# Patient Record
Sex: MALE | NOT HISPANIC OR LATINO | Employment: UNEMPLOYED | URBAN - METROPOLITAN AREA
[De-identification: names, ages, dates, MRNs, and addresses within clinical notes are randomized per-mention and may not be internally consistent; named-entity substitution may affect disease eponyms.]

---

## 2022-03-01 ENCOUNTER — MEDICAL CORRESPONDENCE (OUTPATIENT)
Dept: HEALTH INFORMATION MANAGEMENT | Facility: CLINIC | Age: 22
End: 2022-03-01

## 2022-03-01 ENCOUNTER — TRANSFERRED RECORDS (OUTPATIENT)
Dept: HEALTH INFORMATION MANAGEMENT | Facility: CLINIC | Age: 22
End: 2022-03-01

## 2022-03-02 ENCOUNTER — TRANSCRIBE ORDERS (OUTPATIENT)
Dept: OTHER | Age: 22
End: 2022-03-02

## 2022-03-02 DIAGNOSIS — S62.639A: Primary | ICD-10-CM

## 2022-03-04 ENCOUNTER — OFFICE VISIT (OUTPATIENT)
Dept: ORTHOPEDICS | Facility: CLINIC | Age: 22
End: 2022-03-04
Payer: COMMERCIAL

## 2022-03-04 ENCOUNTER — ANCILLARY PROCEDURE (OUTPATIENT)
Dept: GENERAL RADIOLOGY | Facility: CLINIC | Age: 22
End: 2022-03-04
Attending: STUDENT IN AN ORGANIZED HEALTH CARE EDUCATION/TRAINING PROGRAM
Payer: COMMERCIAL

## 2022-03-04 VITALS — BODY MASS INDEX: 26.23 KG/M2 | HEIGHT: 71 IN | WEIGHT: 187.39 LBS

## 2022-03-04 DIAGNOSIS — S62.639A: ICD-10-CM

## 2022-03-04 DIAGNOSIS — S62.637D CLOSED DISPLACED FRACTURE OF DISTAL PHALANX OF LEFT LITTLE FINGER WITH ROUTINE HEALING, SUBSEQUENT ENCOUNTER: Primary | ICD-10-CM

## 2022-03-04 LAB — RADIOLOGIST FLAGS: NORMAL

## 2022-03-04 PROCEDURE — 73140 X-RAY EXAM OF FINGER(S): CPT | Mod: LT | Performed by: RADIOLOGY

## 2022-03-04 PROCEDURE — 99204 OFFICE O/P NEW MOD 45 MIN: CPT | Performed by: STUDENT IN AN ORGANIZED HEALTH CARE EDUCATION/TRAINING PROGRAM

## 2022-03-04 NOTE — LETTER
"  3/4/2022      RE: Tino Anderson Clercq  [address]  [facility] [address]       [facility]  Sports Medicine Clinic  Clinics and Surgery Center           SUBJECTIVE       Tino Griffin is a 21 year old male presenting to clinic today with left finger injury. He was practicing a tackle and the other person fell on his hand and had immediate pain without deformity. He was seen at [facility] on 3/1/22, where they obtained XR which revealed a DIP fx of the little finger. He was placed in a removable finger splint and referred her for further evaluation. Reports swelling and slight ecchymosis. He is right hand dominant. He does enjoy playing [employer] in his spare time.     Background:   Date of injury: 2/27/22  Duration of symptoms: 4 days   Mechanism of Injury: Fall while doing [employer]   Intensity: 0/10   Aggravating factors: Movement   Relieving Factors: splint   Prior Evaluation: Yes [facility], Medications and Allergies were reviewed and updated as needed.    ROS:  As noted above otherwise negative.    There is no problem list on file for this patient.      No current outpatient medications on file.            OBJECTIVE:       Vitals:   Vitals:    03/04/22 1502   Weight: 85 kg (187 lb 6.3 oz)   Height: 1.8 m (5' 10.87\")     BMI: Body mass index is 26.23 kg/m .    Gen:  Well nourished and in no acute distress  HEENT: Extraocular movement intact  Neck: Supple  Pulm:  Breathing Comfortably. No increased respiratory effort.  Psych: Euthymic. Appropriately answers questions    MSK: Left little finger with ecchymosis and edema along the DIP region.  Inability for full passive extension of the DIP, active extension of the DIP for the last 5 degrees.  Mild tenderness to palpation along the DIP region without evidence of rotational deformity.   strength mildly reduced 4/5.    XRAY : New x-ray obtained today with the DIP mildly displaced fracture of the base of the distal " phalanx involving roughly 50 to 70% of the articular surface.          ASSESSMENT and PLAN:     Tino was seen today for consult for.    Diagnoses and all orders for this visit:    Fracture, finger, distal phalanx  -     Orthopedic  Referral  -     X-ray lt finger 2-3 view; Future    21-year-old male presenting to clinic today with concerns for a mildly displaced fracture of the base of the distal phalanx.  This is complicated due to the fact the patient does have about greater than 50% of the articular surface involved.  This is shown again on x-ray today.  I have discussed this with the patient in detail.    Plan: Given this fracture pattern, we have referred the patient to orthopedic hand surgery for further evaluation given the involvement of the articular surface.  Schedule an appointment has been made.  Please follow-up at that point.  We have also continue to place the patient in a mallet finger splint.  He is leaving for a trip this weekend and will be gone through next Wednesday.  Have advised on protection precautions that he should ensue while traveling as well as wearing of the splint throughout the time.    Options for treatment and/or follow-up care were reviewed with the patient was actively involved in the decision making process. Patient verbalized understanding and was in agreement with the plan.    Marty Tejada DO  , Sports Medicine  Department of Family Medicine and [facility]  [facility]        Marty Tejada DO

## 2022-03-04 NOTE — LETTER
Date:March 7, 2022      Patient was self referred, no letter generated. Do not send.        [facility] Information

## 2022-03-04 NOTE — PROGRESS NOTES
"[facility]  Sports Medicine Clinic  Clinics and Surgery Center           SUBJECTIVE       Tino Griffin is a 21 year old male presenting to clinic today with left finger injury. He was practicing a tackle and the other person fell on his hand and had immediate pain without deformity. He was seen at [facility] on 3/1/22, where they obtained XR which revealed a DIP fx of the little finger. He was placed in a removable finger splint and referred her for further evaluation. Reports swelling and slight ecchymosis. He is right hand dominant. He does enjoy playing [employer] in his spare time.     Background:   Date of injury: 2/27/22  Duration of symptoms: 4 days   Mechanism of Injury: Fall while doing [employer]   Intensity: 0/10   Aggravating factors: Movement   Relieving Factors: splint   Prior Evaluation: Yes [facility], Medications and Allergies were reviewed and updated as needed.    ROS:  As noted above otherwise negative.    There is no problem list on file for this patient.      No current outpatient medications on file.            OBJECTIVE:       Vitals:   Vitals:    03/04/22 1502   Weight: 85 kg (187 lb 6.3 oz)   Height: 1.8 m (5' 10.87\")     BMI: Body mass index is 26.23 kg/m .    Gen:  Well nourished and in no acute distress  HEENT: Extraocular movement intact  Neck: Supple  Pulm:  Breathing Comfortably. No increased respiratory effort.  Psych: Euthymic. Appropriately answers questions    MSK: Left little finger with ecchymosis and edema along the DIP region.  Inability for full passive extension of the DIP, active extension of the DIP for the last 5 degrees.  Mild tenderness to palpation along the DIP region without evidence of rotational deformity.   strength mildly reduced 4/5.    XRAY : New x-ray obtained today with the DIP mildly displaced fracture of the base of the distal phalanx involving roughly 50 to 70% of the articular surface.          ASSESSMENT and PLAN: "     Tino was seen today for consult for.    Diagnoses and all orders for this visit:    Fracture, finger, distal phalanx  -     Orthopedic  Referral  -     X-ray lt finger 2-3 view; Future    21-year-old male presenting to clinic today with concerns for a mildly displaced fracture of the base of the distal phalanx.  This is complicated due to the fact the patient does have about greater than 50% of the articular surface involved.  This is shown again on x-ray today.  I have discussed this with the patient in detail.    Plan: Given this fracture pattern, we have referred the patient to orthopedic hand surgery for further evaluation given the involvement of the articular surface.  Schedule an appointment has been made.  Please follow-up at that point.  We have also continue to place the patient in a mallet finger splint.  He is leaving for a trip this weekend and will be gone through next Wednesday.  Have advised on protection precautions that he should ensue while traveling as well as wearing of the splint throughout the time.    Options for treatment and/or follow-up care were reviewed with the patient was actively involved in the decision making process. Patient verbalized understanding and was in agreement with the plan.    Marty Tejada DO  , Sports Medicine  Department of Family Medicine and [facility]  [facility]

## 2022-03-04 NOTE — PROGRESS NOTES
He was practicing a tackle and the other person fell on his hand and had immediate pain without deformity. He was seen at [facility] on 3/1/22, where they obtained XR which revealed a DIP fx of the little finger. He was placed in a removable finger splint and referred her for further evaluation. Reports swelling and slight ecchymosis. He is right hand dominant. He does enjoy playing [employer] in his spare time.

## 2022-03-07 NOTE — TELEPHONE ENCOUNTER
RECORDS RECEIVED FROM: Left finger avulsion fx little finger OK per elizabeth   DATE RECEIVED: Mar 8, 2022     NOTES STATUS DETAILS   OFFICE NOTE from referring provider Internal  Marty Tejada DO          OFFICE NOTE from other specialist INTERNAL MIGUEL   MEDICATION LIST Internal    XRAYS (IMAGES & REPORTS) Internal 3/4/22

## 2022-03-08 ENCOUNTER — LAB (OUTPATIENT)
Dept: LAB | Facility: CLINIC | Age: 22
End: 2022-03-08
Payer: COMMERCIAL

## 2022-03-08 ENCOUNTER — TELEPHONE (OUTPATIENT)
Dept: ORTHOPEDICS | Facility: CLINIC | Age: 22
End: 2022-03-08

## 2022-03-08 ENCOUNTER — HOSPITAL ENCOUNTER (OUTPATIENT)
Facility: AMBULATORY SURGERY CENTER | Age: 22
End: 2022-03-08
Attending: STUDENT IN AN ORGANIZED HEALTH CARE EDUCATION/TRAINING PROGRAM
Payer: COMMERCIAL

## 2022-03-08 ENCOUNTER — TRANSFERRED RECORDS (OUTPATIENT)
Dept: HEALTH INFORMATION MANAGEMENT | Facility: CLINIC | Age: 22
End: 2022-03-08

## 2022-03-08 ENCOUNTER — PRE VISIT (OUTPATIENT)
Dept: ORTHOPEDICS | Facility: CLINIC | Age: 22
End: 2022-03-08
Payer: COMMERCIAL

## 2022-03-08 ENCOUNTER — OFFICE VISIT (OUTPATIENT)
Dept: ORTHOPEDICS | Facility: CLINIC | Age: 22
End: 2022-03-08
Payer: COMMERCIAL

## 2022-03-08 ENCOUNTER — ANCILLARY PROCEDURE (OUTPATIENT)
Dept: GENERAL RADIOLOGY | Facility: CLINIC | Age: 22
End: 2022-03-08
Attending: STUDENT IN AN ORGANIZED HEALTH CARE EDUCATION/TRAINING PROGRAM
Payer: COMMERCIAL

## 2022-03-08 DIAGNOSIS — S62.637D CLOSED DISPLACED FRACTURE OF DISTAL PHALANX OF LEFT LITTLE FINGER WITH ROUTINE HEALING, SUBSEQUENT ENCOUNTER: ICD-10-CM

## 2022-03-08 DIAGNOSIS — Z11.59 ENCOUNTER FOR SCREENING FOR OTHER VIRAL DISEASES: Primary | ICD-10-CM

## 2022-03-08 LAB — SARS-COV-2 RNA RESP QL NAA+PROBE: NEGATIVE

## 2022-03-08 PROCEDURE — 73140 X-RAY EXAM OF FINGER(S): CPT | Mod: LT | Performed by: RADIOLOGY

## 2022-03-08 PROCEDURE — 99204 OFFICE O/P NEW MOD 45 MIN: CPT | Performed by: STUDENT IN AN ORGANIZED HEALTH CARE EDUCATION/TRAINING PROGRAM

## 2022-03-08 PROCEDURE — 99000 SPECIMEN HANDLING OFFICE-LAB: CPT | Performed by: PATHOLOGY

## 2022-03-08 PROCEDURE — U0005 INFEC AGEN DETEC AMPLI PROBE: HCPCS | Mod: 90 | Performed by: PATHOLOGY

## 2022-03-08 PROCEDURE — U0003 INFECTIOUS AGENT DETECTION BY NUCLEIC ACID (DNA OR RNA); SEVERE ACUTE RESPIRATORY SYNDROME CORONAVIRUS 2 (SARS-COV-2) (CORONAVIRUS DISEASE [COVID-19]), AMPLIFIED PROBE TECHNIQUE, MAKING USE OF HIGH THROUGHPUT TECHNOLOGIES AS DESCRIBED BY CMS-2020-01-R: HCPCS | Mod: 90 | Performed by: PATHOLOGY

## 2022-03-08 RX ORDER — TRIAMCINOLONE ACETONIDE 1 MG/G
CREAM TOPICAL
COMMUNITY
Start: 2022-02-11

## 2022-03-08 NOTE — TELEPHONE ENCOUNTER
RN Care Coordinator: Michael Catalan; [phone]    Surgery is scheduled with Dr. Machado on 3/14 at the [facility].  Scheduled per urgency.    H&P to be completed by [facility] or [facility]    COVID-19 test: 3/10 at [facility], lab 1st floor    Post-op: 3/29, in person visit    Patient will receive a phone call from pre-admission nurses 1-2 days prior to surgery with arrival and start time.    Patient will complete COVID-19 test that was scheduled by surgical coordinator 2-4 days prior to surgery.     I left a detailed voicemail regarding the scheduled information and asked for a call back. Provided direct line. Will watch for call back from patient.     The surgery packet was provided by the RN during appointment

## 2022-03-08 NOTE — TELEPHONE ENCOUNTER
[facility] Call Center    Phone Message    May a detailed message be left on voicemail: yes     Reason for Call: Other: Patient was unable to schedule pre op with [facility], would like to know if there are any suggestions on where to go for his pre op physical exam. Please call regarding this.     Action Taken: Other: [facility]    Travel Screening: Not Applicable

## 2022-03-08 NOTE — LETTER
3/8/2022         RE: Tino Bridges  [address]  [facility] [address]        Dear Colleague,    Thank you for referring your patient, Tino Bridges, to the [facility] ORTHOPEDIC CLINIC [place]. Please see a copy of my visit note below.    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 He was wrestling 2 weeks ago on a Sunday when the other wrestler fell down onto the patient's left small finger. He did not seek care until 2 days later when the pain did not improve. He has been wearing a mallet splint since that ER visit. No other injuries.     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 Despite adjustment of mallet splinting and repeat XR, the fracture was not reduced and the articular gap was at least 2-mm with residual extensor lag. My recommendation is to better reduce the bony mallet and extension block pin additional longitudinal pin. The goal will be to reduce the risk of developing premature post-traumatic arthritis in the DIP joint as well as worsening mallet deformity. Patient understands and is agreeable.   -Discussed the risks of surgery, including but not limited to: infection, bleeding, pain, poor scarring, injury to tendons or nerves, malunion, nonunion, retained hardware, need for revision surgery,  complex regional pain syndrome. Despite these risks, patient consents to and would like to proceed with left small finger distal phalanx mallet fracture closed versus open reduction with extension block pinning.   -Preoperative photography and consent will be obtained on day of surgery  -MAC with digital block  -Case request placed  -A total of 30 minutes was devoted to review of chart, direct face-to-face patient counseling and documentation during this encounter    Marlon Machado MD, PhD

## 2022-03-08 NOTE — PROGRESS NOTES
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 He was wrestling 2 weeks ago on a Sunday when the other wrestler fell down onto the patient's left small finger. He did not seek care until 2 days later when the pain did not improve. He has been wearing a mallet splint since that ER visit. No other injuries.     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 Despite adjustment of mallet splinting and repeat XR, the fracture was not reduced and the articular gap was at least 2-mm with residual extensor lag. My recommendation is to better reduce the bony mallet and extension block pin additional longitudinal pin. The goal will be to reduce the risk of developing premature post-traumatic arthritis in the DIP joint as well as worsening mallet deformity. Patient understands and is agreeable.   -Discussed the risks of surgery, including but not limited to: infection, bleeding, pain, poor scarring, injury to tendons or nerves, malunion, nonunion, retained hardware, need for revision surgery, complex regional pain syndrome. Despite these risks, patient consents to and would like to proceed with left small finger distal phalanx mallet fracture closed versus open reduction with extension block pinning.   -Preoperative photography and consent will be obtained on day of  surgery  -MAC with digital block  -Case request placed  -A total of 30 minutes was devoted to review of chart, direct face-to-face patient counseling and documentation during this encounter    Marlon Machado MD, PhD

## 2022-03-08 NOTE — TELEPHONE ENCOUNTER
Possible to add on this Thursday?    Procedure: Left small finger mallet fracture extension block pinning  Facility: [facility]  Length: 60 minutes  Anesthesia: Local, MAC  Post-op appointments needed: 2 weeks provider and OT to follow  Surgery packet/instructions given to patient?  Yes     Michael Catalan RN

## 2022-03-08 NOTE — NURSING NOTE
Reason For Visit:   Chief Complaint   Patient presents with     Consult For     Left small finger fracture       Primary MD: No primary care provider on file.    ?  No    Age: 21 year old    Date of injury: 2/27/22  Type of injury: Left small finger fracture - wrestling.  Date of surgery: NA  Type of surgery: NA.  Smoker: No  Request smoking cessation information: No      There were no vitals taken for this visit.      Pain Assessment  Patient Currently in Pain: Yes  0-10 Pain Scale: 2  Primary Pain Location: Finger (Comment which one) (Left small finger)               QuickDASH Assessment  No flowsheet data found.       No Known Allergies    Angelica Gold ATC

## 2022-03-08 NOTE — NURSING NOTE
Pre-Operative Teaching Flowsheet     Person(s) involved in teaching: Patient     Motivation Level:  Receptive (willing/able to accept information) and asks appropriate questions where applicable: Yes  Any cultural factors/Moravian beliefs that may influence understanding or compliance? No     Patient demonstrates understanding of the following:  Pre-operative planning, including the necessary appointments and preparation needed prior to surgery: Yes  Which situations necessitate calling provider and whom to contact: Yes  Pain management techniques pre and post op: Yes  How, and when, to access community resources: Yes    Additional Teaching Concerns Addressed:  Post-operative living arrangements and necessary adaptations to living environment.     Instructional Materials Used/Given: Yes, pre-op packet printed from [facility] with additional system forms added (COVID Testing Handout, Map, etc). Pre-op soap given (if in clinic).    Pt looking to have pre-op physical with [facility] and would like a call from the financial department regarding estimated coverage and cost. Pt is aware he can also utilize the PAC clinic for possible H&P.     Time spent with patient: 20 minutes.    Michael Catalan RN

## 2022-03-09 NOTE — TELEPHONE ENCOUNTER
Pt called and not sure where to get pre-op (not able to get in with [facility] apparently. Are you able to reach out to help look at options? Or a clinic coordinator maybe?    Also, XR's will be ordered as the appt gets closer by clinic rooming staff.    Michael Catalan RN

## 2022-03-09 NOTE — TELEPHONE ENCOUNTER
Patient left voicemail on 3/8 inquiring if surgery is on Thursday, 3/10 or Monday, 3/14.    Called patient on 3/9 and left voicemail explaining surgery is on 3/14.    Went over appointments again.    Inquired if patient has set up H&P appointment yet.    Provided direct number.

## 2022-03-09 NOTE — TELEPHONE ENCOUNTER
Message sent to surgs  regarding this to possibly get in with [facility]. Will continue to document there.    Michael Catalan RN

## 2022-03-09 NOTE — TELEPHONE ENCOUNTER
Spoke with Tino.    Tino did get an H&P at [facility] yesterday. I gave him the fax number and he will have [facility] fax the H&P over to us.     Tino said he got a [employer] message saying he will have to pay $[phone] for this. He said the message stated a large amount being to upon check-in for surgery. I told him no authorization is needed per securing message and that he can call his insurance to ask what they will cover/what he will pay based on deductible. I think his insurance told him this amount. Message sent to [facility] with request to call Tino tomorrow to discuss.

## 2022-03-10 ENCOUNTER — LAB (OUTPATIENT)
Dept: LAB | Facility: CLINIC | Age: 22
End: 2022-03-10
Attending: STUDENT IN AN ORGANIZED HEALTH CARE EDUCATION/TRAINING PROGRAM
Payer: COMMERCIAL

## 2022-03-10 DIAGNOSIS — Z11.59 ENCOUNTER FOR SCREENING FOR OTHER VIRAL DISEASES: ICD-10-CM

## 2022-03-10 LAB — SARS-COV-2 RNA RESP QL NAA+PROBE: NEGATIVE

## 2022-03-10 PROCEDURE — U0005 INFEC AGEN DETEC AMPLI PROBE: HCPCS | Mod: 90 | Performed by: PATHOLOGY

## 2022-03-10 PROCEDURE — 99000 SPECIMEN HANDLING OFFICE-LAB: CPT | Performed by: PATHOLOGY

## 2022-03-10 PROCEDURE — U0003 INFECTIOUS AGENT DETECTION BY NUCLEIC ACID (DNA OR RNA); SEVERE ACUTE RESPIRATORY SYNDROME CORONAVIRUS 2 (SARS-COV-2) (CORONAVIRUS DISEASE [COVID-19]), AMPLIFIED PROBE TECHNIQUE, MAKING USE OF HIGH THROUGHPUT TECHNOLOGIES AS DESCRIBED BY CMS-2020-01-R: HCPCS | Mod: 90 | Performed by: PATHOLOGY

## 2022-03-11 ENCOUNTER — MYC MEDICAL ADVICE (OUTPATIENT)
Dept: SURGERY | Facility: AMBULATORY SURGERY CENTER | Age: 22
End: 2022-03-11
Payer: COMMERCIAL

## 2022-03-11 ENCOUNTER — TELEPHONE (OUTPATIENT)
Dept: ORTHOPEDICS | Facility: CLINIC | Age: 22
End: 2022-03-11
Payer: COMMERCIAL

## 2022-03-11 DIAGNOSIS — S62.637D CLOSED DISPLACED FRACTURE OF DISTAL PHALANX OF LEFT LITTLE FINGER WITH ROUTINE HEALING, SUBSEQUENT ENCOUNTER: Primary | ICD-10-CM

## 2022-03-11 RX ORDER — ONDANSETRON 4 MG/1
4 TABLET, ORALLY DISINTEGRATING ORAL EVERY 30 MIN PRN
Status: CANCELLED | OUTPATIENT
Start: 2022-03-11

## 2022-03-11 RX ORDER — HYDROMORPHONE HYDROCHLORIDE 1 MG/ML
0.2 INJECTION, SOLUTION INTRAMUSCULAR; INTRAVENOUS; SUBCUTANEOUS EVERY 5 MIN PRN
Status: CANCELLED | OUTPATIENT
Start: 2022-03-11

## 2022-03-11 RX ORDER — SODIUM CHLORIDE, SODIUM LACTATE, POTASSIUM CHLORIDE, CALCIUM CHLORIDE 600; 310; 30; 20 MG/100ML; MG/100ML; MG/100ML; MG/100ML
INJECTION, SOLUTION INTRAVENOUS CONTINUOUS
Status: CANCELLED | OUTPATIENT
Start: 2022-03-11

## 2022-03-11 RX ORDER — OXYCODONE HYDROCHLORIDE 5 MG/1
5 TABLET ORAL EVERY 4 HOURS PRN
Status: CANCELLED | OUTPATIENT
Start: 2022-03-11

## 2022-03-11 RX ORDER — FENTANYL CITRATE 50 UG/ML
25 INJECTION, SOLUTION INTRAMUSCULAR; INTRAVENOUS
Status: CANCELLED | OUTPATIENT
Start: 2022-03-11

## 2022-03-11 RX ORDER — ONDANSETRON 2 MG/ML
4 INJECTION INTRAMUSCULAR; INTRAVENOUS EVERY 30 MIN PRN
Status: CANCELLED | OUTPATIENT
Start: 2022-03-11

## 2022-03-11 RX ORDER — MEPERIDINE HYDROCHLORIDE 25 MG/ML
12.5 INJECTION INTRAMUSCULAR; INTRAVENOUS; SUBCUTANEOUS
Status: CANCELLED | OUTPATIENT
Start: 2022-03-11

## 2022-03-11 RX ORDER — ACETAMINOPHEN 325 MG/1
975 TABLET ORAL ONCE
Status: CANCELLED | OUTPATIENT
Start: 2022-03-11 | End: 2022-03-11

## 2022-03-11 RX ORDER — FENTANYL CITRATE 50 UG/ML
25 INJECTION, SOLUTION INTRAMUSCULAR; INTRAVENOUS EVERY 5 MIN PRN
Status: CANCELLED | OUTPATIENT
Start: 2022-03-11

## 2022-03-11 RX ORDER — LIDOCAINE 40 MG/G
CREAM TOPICAL
Status: CANCELLED | OUTPATIENT
Start: 2022-03-11

## 2022-03-11 NOTE — TELEPHONE ENCOUNTER
Patient was transferred to writer from ortho scheduling line.     Voicemail left at 1:45 PM.     Patient requesting to cancel surgery with Dr. Machado on Monday, 3/14.    Surgery cancelled.    Appointments also cancelled (post-op and hand OT).    Routing to team.

## 2022-03-14 ENCOUNTER — TRANSFERRED RECORDS (OUTPATIENT)
Dept: HEALTH INFORMATION MANAGEMENT | Facility: CLINIC | Age: 22
End: 2022-03-14
Payer: COMMERCIAL

## 2022-03-22 ENCOUNTER — OFFICE VISIT (OUTPATIENT)
Dept: ORTHOPEDICS | Facility: CLINIC | Age: 22
End: 2022-03-22
Payer: COMMERCIAL

## 2022-03-22 ENCOUNTER — ANCILLARY PROCEDURE (OUTPATIENT)
Dept: GENERAL RADIOLOGY | Facility: CLINIC | Age: 22
End: 2022-03-22
Attending: STUDENT IN AN ORGANIZED HEALTH CARE EDUCATION/TRAINING PROGRAM
Payer: COMMERCIAL

## 2022-03-22 ENCOUNTER — THERAPY VISIT (OUTPATIENT)
Dept: OCCUPATIONAL THERAPY | Facility: CLINIC | Age: 22
End: 2022-03-22
Payer: COMMERCIAL

## 2022-03-22 DIAGNOSIS — S62.637D CLOSED DISPLACED FRACTURE OF DISTAL PHALANX OF LEFT LITTLE FINGER WITH ROUTINE HEALING, SUBSEQUENT ENCOUNTER: Primary | ICD-10-CM

## 2022-03-22 DIAGNOSIS — S62.637D CLOSED DISPLACED FRACTURE OF DISTAL PHALANX OF LEFT LITTLE FINGER WITH ROUTINE HEALING, SUBSEQUENT ENCOUNTER: ICD-10-CM

## 2022-03-22 LAB — RADIOLOGIST FLAGS: NORMAL

## 2022-03-22 PROCEDURE — 73130 X-RAY EXAM OF HAND: CPT | Mod: LT | Performed by: RADIOLOGY

## 2022-03-22 PROCEDURE — 97760 ORTHOTIC MGMT&TRAING 1ST ENC: CPT | Mod: GO | Performed by: OCCUPATIONAL THERAPIST

## 2022-03-22 PROCEDURE — 99213 OFFICE O/P EST LOW 20 MIN: CPT | Performed by: STUDENT IN AN ORGANIZED HEALTH CARE EDUCATION/TRAINING PROGRAM

## 2022-03-22 PROCEDURE — 97165 OT EVAL LOW COMPLEX 30 MIN: CPT | Mod: GO | Performed by: OCCUPATIONAL THERAPIST

## 2022-03-22 NOTE — PROGRESS NOTES
Hand Therapy Initial Evaluation    Current Date:  3/22/2022    Diagnosis: L SF mallet finger  DOI: 2/106268 (3/22/2022 MD order date)    Precautions: Mallet finger splint full time    Subjective:  Tino Bridges is a 21 year old male.    Patient reports symptoms of the left small finger which occurred due to wrestling accident. Since onset symptoms are Unchanged  General health as reported by patient is good.  Pertinent medical history includes:None  Medical allergies:none.  Surgical history: other: wisdom tooth removal.  Medication history: None.    No past medical history on file.  No past surgical history on file.    Current occupation is graduate studies - [employer]  Job Tasks: Computer Work    Occupational Profile Information:  Right hand dominant  Patient reports symptoms of pain, stiffness/loss of motion, weakness/loss of strength and edema  Special tests:  x-ray - after hand therapy appointment.    Previous treatment: None  Barriers include:none  Mobility: No difficulty  Currently working in normal job without restrictions  Leisure activities/hobbies: Enjoys playing violin    Functional Outcome Measure:   Upper Extremity Functional Index Score:  SCORE:   Column Totals: /80: 73   (A lower score indicates greater disability.)    Objective:  Pain Level (Scale 0-10):   3/22/2022   At Rest 0/10   With Use 1/10     Pain Description:  Date 3/22/2022   Location small finger   Pain Quality Dull   Frequency intermittent     Pain is worst  daytime   Exacerbated by  Unknown   Relieved by rest   Progression Unchanged     Edema (Circumference measured in cm)   3/22/2022 3/22/2022   Small finger R L   P1 6.0 6.3   PIP 5.7 5.8   P2 4.9 5.3     Sensation  WNL throughout all nerve distributions; per patient report    ROM  Contraindicated    Strength  Contraindicated    Assessment:  Patient presents with symptoms consistent with diagnosis of left small finger mallet,  with non-surgical intervention.     Patient's  limitations or Problem List includes:  Pain and Increased edema of the left small finger which interferes with the patient's ability to perform Self Care Tasks (eating), Recreational Activities and Household Chores as compared to previous level of function.    Rehab Potential:  Good - Return to full activity, some limitations    Patient will benefit from skilled Occupational Therapy to increase ROM and overall strength and decrease pain and edema to return to previous activity level and resume normal daily tasks and to reach their rehab potential.    Barriers to Learning:  No barrier    Communication Issues:  Patient appears to be able to clearly communicate and understand verbal and written communication and follow directions correctly.    Chart Review: Chart Review, Brief history including review of medical and/or therapy records relating to the presenting problem and Simple history review with patient    Identified Performance Deficits: home establishment and management, meal preparation and cleanup and leisure activities    Assessment of Occupational Performance:  3-5 Performance Deficits    Clinical Decision Making (Complexity): Low complexity    Treatment Explanation:  The following has been discussed with the patient:  RX ordered/plan of care  Anticipated outcomes  Possible risks and side effects    Plan:  Frequency:  2 X a month, once daily  Duration:  for 2 months    Treatment Plan:   Modalities:  US and Paraffin  Therapeutic Exercise:  AROM, PROM and Blocking  Manual Techniques:  Manual edema mobilization  Orthotic Fabrication:  Static and Finger based  Self Care:  Self Care Tasks and Work Tasks  Discharge Plan:  Achieve all LTG.  Independent in home treatment program.  Reach maximal therapeutic benefit.    Home Exercise Program:  Mallet Finger orthosis, full time, only removed for hygiene and tip kept straight during that time    Next Visit:  Check fit of orthosis

## 2022-03-22 NOTE — NURSING NOTE
Reason For Visit:   Chief Complaint   Patient presents with     Follow Up     Left small finger fracture,  2/27/22     Hand dominance: Right     Pain Assessment  Patient Currently in Pain: Denies        No Known Allergies        Ruth Ann Pineda LPN

## 2022-03-22 NOTE — LETTER
3/22/2022     RE: Tino Bridges  [address]  [facility] [address]        Dear Colleague,    Thank you for referring your patient, Tino Bridges, to the [facility] ORTHOPEDIC CL[facility]IC [place]. Please see a copy of my visit note below.    Ortho Hand    Patient returns after canceling surgery due to insurance reasons.  He does not want to have surgery.  He has been continuing the off-the-shelf mallet splint.  He has been wearing the splint at all times.    On exam, there is no extensor lag at the left small finger DIP joint.  Little to no swelling of the left small finger at the DIP joint.  Minimally tender over the fracture.    A/P: 21-year-old presenting 4 weeks status post left small finger bony mallet with displacement, that will be managed nonoperatively    -OT today for left small finger DIP extension splint.  The goal will be to slightly hyperextend the distal interphalangeal joint to hopefully improve the bony alignment, although it is already 4 weeks since the injury.  Once the custom DIP extension splint has been placed, we will obtain an additional XR. Of note, patient did not return after OT splint fabrication. We tried to contact the patient by phone and patient did not respond.  -Return to clinic in 4-6 weeks  -A total of 30 minutes was devoted to review of chart, direct face-to-face patient counseling and documentation during this encounter    Marlon Machado MD, PhD      Again, thank you for allowing me to participate in the care of your patient.        Sincerely,        Marlon Machado MD

## 2022-03-22 NOTE — PROGRESS NOTES
Ortho Hand    Patient returns after canceling surgery due to insurance reasons.  He does not want to have surgery.  He has been continuing the off-the-shelf mallet splint.  He has been wearing the splint at all times.    On exam, there is no extensor lag at the left small finger DIP joint.  Little to no swelling of the left small finger at the DIP joint.  Minimally tender over the fracture.    A/P: 21-year-old presenting 4 weeks status post left small finger bony mallet with displacement, that will be managed nonoperatively    -OT today for left small finger DIP extension splint.  The goal will be to slightly hyperextend the distal interphalangeal joint to hopefully improve the bony alignment, although it is already 4 weeks since the injury.  Once the custom DIP extension splint has been placed, we will obtain an additional XR. Of note, patient did not return after OT splint fabrication. We tried to contact the patient by phone and patient did not respond.  -Return to clinic in 4-6 weeks  -A total of 30 minutes was devoted to review of chart, direct face-to-face patient counseling and documentation during this encounter    Marlon Machado MD, PhD

## 2022-03-31 ENCOUNTER — THERAPY VISIT (OUTPATIENT)
Dept: OCCUPATIONAL THERAPY | Facility: CLINIC | Age: 22
End: 2022-03-31
Payer: COMMERCIAL

## 2022-03-31 DIAGNOSIS — S62.637D CLOSED DISPLACED FRACTURE OF DISTAL PHALANX OF LEFT LITTLE FINGER WITH ROUTINE HEALING, SUBSEQUENT ENCOUNTER: Primary | ICD-10-CM

## 2022-03-31 PROCEDURE — 97763 ORTHC/PROSTC MGMT SBSQ ENC: CPT | Mod: GO | Performed by: OCCUPATIONAL THERAPIST

## 2022-04-03 ENCOUNTER — HEALTH MAINTENANCE LETTER (OUTPATIENT)
Age: 22
End: 2022-04-03

## 2022-04-14 DIAGNOSIS — S62.637D CLOSED DISPLACED FRACTURE OF DISTAL PHALANX OF LEFT LITTLE FINGER WITH ROUTINE HEALING, SUBSEQUENT ENCOUNTER: Primary | ICD-10-CM

## 2022-04-26 ENCOUNTER — OFFICE VISIT (OUTPATIENT)
Dept: ORTHOPEDICS | Facility: CLINIC | Age: 22
End: 2022-04-26
Payer: COMMERCIAL

## 2022-04-26 DIAGNOSIS — S62.637D CLOSED DISPLACED FRACTURE OF DISTAL PHALANX OF LEFT LITTLE FINGER WITH ROUTINE HEALING, SUBSEQUENT ENCOUNTER: Primary | ICD-10-CM

## 2022-04-26 PROCEDURE — 99213 OFFICE O/P EST LOW 20 MIN: CPT | Performed by: STUDENT IN AN ORGANIZED HEALTH CARE EDUCATION/TRAINING PROGRAM

## 2022-04-26 RX ORDER — BENZONATATE 100 MG/1
CAPSULE ORAL
COMMUNITY
Start: 2022-03-29

## 2022-04-26 NOTE — LETTER
4/26/2022         RE: Tino Bridges  [address]  [facility] [address]        Dear Colleague,    Thank you for referring your patient, Tino Bridges, to the [facility] [place]. Please see a copy of my visit note below.    Ortho Hand    No issues.  Has been wearing the splint at all times.    On exam, mild tenderness over the left small finger distal phalanx fracture, but no extensor lag.    XR: Improved left small finger mallet fracture alignment with bone healing    A/P: 21-year-old male with left small finger mallet fracture, managed nonoperatively, and healing    -Since there is clinical tenderness, my recommendation is for the patient to continue to wear the custom extension splint at all times for the next 2 weeks.  Patient will return to clinic in 2 weeks for clinical examination.  If nontender, we will transition to nighttime only splinting for an additional 6 weeks.  Patient is agreeable.  -Return to clinic in 2 weeks with repeat XR and exam  -A total of 10 minutes was devoted to review of chart, direct face-to-face patient counseling and documentation during this encounter    Marlon Machado MD, PhD      Again, thank you for allowing me to participate in the care of your patient.        Sincerely,        Marlon Machado MD

## 2022-04-26 NOTE — PROGRESS NOTES
Ortho Hand    No issues.  Has been wearing the splint at all times.    On exam, mild tenderness over the left small finger distal phalanx fracture, but no extensor lag.    XR: Improved left small finger mallet fracture alignment with bone healing    A/P: 21-year-old male with left small finger mallet fracture, managed nonoperatively, and healing    -Since there is clinical tenderness, my recommendation is for the patient to continue to wear the custom extension splint at all times for the next 2 weeks.  Patient will return to clinic in 2 weeks for clinical examination.  If nontender, we will transition to nighttime only splinting for an additional 6 weeks.  Patient is agreeable.  -Return to clinic in 2 weeks with repeat XR and exam  -A total of 10 minutes was devoted to review of chart, direct face-to-face patient counseling and documentation during this encounter    Marlon Machado MD, PhD

## 2022-04-26 NOTE — NURSING NOTE
Reason For Visit:   Chief Complaint   Patient presents with     Follow Up     Left small finger fracture,  2/27/22.           Pain Assessment  Patient Currently in Pain: Denies        No Known Allergies        Ruth Ann Pineda LPN

## 2022-04-26 NOTE — LETTER
Date:April 27, 2022      Provider requested that no letter be sent. Do not send.       [facility]

## 2022-05-06 DIAGNOSIS — S62.637D CLOSED DISPLACED FRACTURE OF DISTAL PHALANX OF LEFT LITTLE FINGER WITH ROUTINE HEALING, SUBSEQUENT ENCOUNTER: Primary | ICD-10-CM

## 2022-05-10 ENCOUNTER — OFFICE VISIT (OUTPATIENT)
Dept: ORTHOPEDICS | Facility: CLINIC | Age: 22
End: 2022-05-10
Payer: COMMERCIAL

## 2022-05-10 ENCOUNTER — ANCILLARY PROCEDURE (OUTPATIENT)
Dept: GENERAL RADIOLOGY | Facility: CLINIC | Age: 22
End: 2022-05-10
Attending: STUDENT IN AN ORGANIZED HEALTH CARE EDUCATION/TRAINING PROGRAM
Payer: COMMERCIAL

## 2022-05-10 DIAGNOSIS — S62.637D CLOSED DISPLACED FRACTURE OF DISTAL PHALANX OF LEFT LITTLE FINGER WITH ROUTINE HEALING, SUBSEQUENT ENCOUNTER: Primary | ICD-10-CM

## 2022-05-10 DIAGNOSIS — S62.637D CLOSED DISPLACED FRACTURE OF DISTAL PHALANX OF LEFT LITTLE FINGER WITH ROUTINE HEALING, SUBSEQUENT ENCOUNTER: ICD-10-CM

## 2022-05-10 PROCEDURE — 73140 X-RAY EXAM OF FINGER(S): CPT | Mod: LT | Performed by: RADIOLOGY

## 2022-05-10 PROCEDURE — 99212 OFFICE O/P EST SF 10 MIN: CPT | Performed by: STUDENT IN AN ORGANIZED HEALTH CARE EDUCATION/TRAINING PROGRAM

## 2022-05-10 NOTE — LETTER
Date:May 10, 2022      Provider requested that no letter be sent. Do not send.       [facility]

## 2022-05-10 NOTE — LETTER
5/10/2022         RE: Tino Bridges  [address]  [facility] [address]        Dear Colleague,    Thank you for referring your patient, Tino Birdges, to the [facility] [place]. Please see a copy of my visit note below.    Ortho Hand    No issues.  Has been wearing the splint for 6 to 7 weeks constantly.    On exam, no tenderness over the left small finger fracture with no extensor lag.    XR: Stable alignment of mallet fracture with bone healing    A/P: 21-year-old male 7 weeks status post cost him mallet splinting of left small finger mallet fracture, doing well    -Patient can wean splint for use during nighttime only.  Encouraged patient to wear the splint for the next month during strenuous activity that would put the finger at risk.  -Patient can return to playing musical instruments  -Patient can return to clinic in 6 weeks.  If everything is improved and with no concerns, patient can cancel the appointment.  -A total of 10 minutes was devoted to review of chart, direct face-to-face patient counseling and documentation during this encounter    Marlon Machado MD, PhD      Again, thank you for allowing me to participate in the care of your patient.        Sincerely,        Marlon Machado MD

## 2022-05-10 NOTE — PROGRESS NOTES
Ortho Hand    No issues.  Has been wearing the splint for 6 to 7 weeks constantly.    On exam, no tenderness over the left small finger fracture with no extensor lag.    XR: Stable alignment of mallet fracture with bone healing    A/P: 21-year-old male 7 weeks status post cost him mallet splinting of left small finger mallet fracture, doing well    -Patient can wean splint for use during nighttime only.  Encouraged patient to wear the splint for the next month during strenuous activity that would put the finger at risk.  -Patient can return to playing musical instruments  -Patient can return to clinic in 6 weeks.  If everything is improved and with no concerns, patient can cancel the appointment.  -A total of 10 minutes was devoted to review of chart, direct face-to-face patient counseling and documentation during this encounter    Marlon Machado MD, PhD

## 2022-05-10 NOTE — NURSING NOTE
Reason For Visit:   Chief Complaint   Patient presents with     Follow Up      Left small finger mallet fracture,  2/27/22           Primary MD: No primary care provider on file.  Ref. MD: shelby     Age: 21 year old    ?  No      There were no vitals taken for this visit.      Pain Assessment  Patient Currently in Pain: No               QuickDASH Assessment  No flowsheet data found.       Current Outpatient Medications   Medication Sig Dispense Refill     benzonatate (TESSALON) 100 MG capsule  (Patient not taking: No sig reported)       triamcinolone (KENALOG) 0.1 % external cream Apply 1 APPLICATION TOPICALLY TO AFFECTED AREA 2 TIMES PER DAY (Patient not taking: No sig reported)         No Known Allergies    Sherron Stockton, ATC

## 2022-06-24 ENCOUNTER — OFFICE VISIT (OUTPATIENT)
Dept: ORTHOPEDICS | Facility: CLINIC | Age: 22
End: 2022-06-24
Payer: COMMERCIAL

## 2022-06-24 DIAGNOSIS — S62.637D CLOSED DISPLACED FRACTURE OF DISTAL PHALANX OF LEFT LITTLE FINGER WITH ROUTINE HEALING, SUBSEQUENT ENCOUNTER: Primary | ICD-10-CM

## 2022-06-24 PROCEDURE — 99202 OFFICE O/P NEW SF 15 MIN: CPT | Performed by: PHYSICIAN ASSISTANT

## 2022-06-24 NOTE — LETTER
6/24/2022         RE: Tino Bridges  [address]  [facility] [address]        Dear Colleague,    Thank you for referring your patient, Tino Bridges, to the [facility] [place]. Please see a copy of my visit note below.    Date of Service: Jun 24, 2022    Chief Complaint:   Chief Complaint   Patient presents with     RECHECK     6 wk follow-up L little finger mallet finger DOI: 2/22/22       Interval events: Tino Bridges is a 21 year old male who presents today in follow-up for left mallet fracture.  Patient was last seen in early May by Dr. Machado.  At that time was recommended that he discontinue full-time use of the mallet splint and transition to just wearing at night.  Patient states that he wore the splint at night for few days and then discontinued completely.  He reports that he has been doing well, he has been able to get back to most activities.  He states he still quite stiff at the DIP.  He is unable to fully flex the DIP to make a tight composite fist.     The past medical history was reviewed updated in the EMR. This includes medications, surgeries, social history, and review of systems.    Physical examination:  Well-developed, well-nourished and in no acute distress.  Alert and oriented to surroundings.  On examination of the left small finger, skin is clean and dry. There is no deformity. No extensor lag.  Able to make a near composite fist. Stiffness of the DIP with flexion to approximately 45 degrees.  Fingers are warm and well-perfused.    Assessment: 21 year old male who presents approximately 4 months s/p left small finger mallet fracture.     Plan:  Recommended referral to hand therapy to work on DIP ROM. Patient plays the viola and would like improved ROM to the DIP. May follow up on an as needed  Basis. Knows to contact us with any questions or concerns.     WALE GARRETT PA-C  June 24, 2022 3:35 PM   Orthopaedic Surgery          Again, thank you for allowing me to participate in the care of your patient.        Sincerely,        Joycelyn Sneed PA-C

## 2022-06-24 NOTE — LETTER
Date:June 26, 2022      Patient was self referred, no letter generated. Do not send.        [facility] Health Information

## 2022-06-24 NOTE — PROGRESS NOTES
Date of Service: Jun 24, 2022    Chief Complaint:   Chief Complaint   Patient presents with     RECHECK     6 wk follow-up L little finger mallet finger DOI: 2/22/22       Interval events: Tino Bridges is a 21 year old male who presents today in follow-up for left mallet fracture.  Patient was last seen in early May by Dr. Machado.  At that time was recommended that he discontinue full-time use of the mallet splint and transition to just wearing at night.  Patient states that he wore the splint at night for few days and then discontinued completely.  He reports that he has been doing well, he has been able to get back to most activities.  He states he still quite stiff at the DIP.  He is unable to fully flex the DIP to make a tight composite fist.     The past medical history was reviewed updated in the EMR. This includes medications, surgeries, social history, and review of systems.    Physical examination:  Well-developed, well-nourished and in no acute distress.  Alert and oriented to surroundings.  On examination of the left small finger, skin is clean and dry. There is no deformity. No extensor lag.  Able to make a near composite fist. Stiffness of the DIP with flexion to approximately 45 degrees.  Fingers are warm and well-perfused.    Assessment: 21 year old male who presents approximately 4 months s/p left small finger mallet fracture.     Plan:  Recommended referral to hand therapy to work on DIP ROM. Patient plays the viola and would like improved ROM to the DIP. May follow up on an as needed  Basis. Knows to contact us with any questions or concerns.     WALE GARRETT PA-C  June 24, 2022 3:35 PM   Orthopaedic Surgery

## 2022-06-24 NOTE — NURSING NOTE
Reason For Visit:   Chief Complaint   Patient presents with     RECHECK     6 wk follow-up L little finger mallet finger DOI: 2/22/22       Primary MD: No primary care provider on file.  Ref. MD: KAYODE    Age: 21 year old    ?  No      There were no vitals taken for this visit.      Pain Assessment  Patient Currently in Pain: No (left little finger pain with activity up to 5/10)    Hand Dominance Evaluation  Hand Dominance: Right          QuickDASH Assessment  QuickDASH Main 6/21/2022   1. Open a tight or new jar. No difficulty   2. Do heavy household chores (e.g., wash walls, floors) No difficulty   3. Carry a shopping bag or briefcase. No difficulty   4. Wash your back. No difficulty   5. Use a knife to cut food. Mild difficulty   6. Recreational activities in which you take some force or impact through your arm, shoulder or hand (e.g., golf, hammering, tennis, etc.). No difficulty   7. During the past week, to what extent has your arm, shoulder or hand problem interfered with your normal social activities with family, friends, neighbours or groups? Not at all   8. During the past week, were you limited in your work or other regular daily activities as a result of your arm, shoulder or hand problem? Not limited at all   9. Arm, shoulder or hand pain. Moderate   10.Tingling (pins and needles) in your arm,shoulder or hand. None   11. During the past week, how much difficulty have you had sleeping because of the pain in your arm, shoulder or hand? No difficulty   Quickdash Ability Score 6.82          Current Outpatient Medications   Medication Sig Dispense Refill     benzonatate (TESSALON) 100 MG capsule  (Patient not taking: No sig reported)       triamcinolone (KENALOG) 0.1 % external cream Apply 1 APPLICATION TOPICALLY TO AFFECTED AREA 2 TIMES PER DAY (Patient not taking: No sig reported)         No Known Allergies    NGUYEN Gonzalez

## 2022-06-30 NOTE — PROGRESS NOTES
Hand Therapy Progress Note    Current Date:  6/30/2022    Diagnosis: Left small mallet finger  DOI: 2/22/2022  Post: 4 months  Referring provider: Joycelyn Sneed PA-C    Reporting period is 3/31/2022 to 6/30/2022    Subjective:   Subjective changes noted by patient:  Patient discontinued wearing splint in May. He continues to report pain and stiffness of the small finger. In particular he reports difficulty with typing, gripping, and playing viola.  Functional changes noted by patient:  Improvement in Self Care Tasks (dressing, eating, bathing, hygiene/toileting) and Household Chores  No Change to Work/Study Tasks and Recreational Activities  Patient has noted adverse reaction to:  None    Functional Outcome Measure:  Upper Extremity Functional Index Score:  SCORE: 74/80   (A lower score indicates greater disability.)      Objective:  Pain Level (Scale 0-10):   3/22/2022 7/1/2022     At Rest 0/10 0   With Use 1/10 2-3     Pain Description:  Date 3/22/2022 7/1/2022     Location small finger Small finger   Pain Quality Dull dull   Frequency intermittent   intermittent   Pain is worst  daytime daytime   Exacerbated by  Unknown Finger flexion   Relieved by Rest rest   Progression Unchanged Gradually improving     Edema (Circumference measured in cm)   3/22/2022 3/22/2022 7/1/2022     Small finger R L L   P1 6.0 6.3 6.0   PIP 5.7 5.8 6.3   P2 4.9 5.3 5.3     Sensation  WNL throughout all nerve distributions; per patient report    ROM  Small Finger 6/30/2022 6/30/2022   AROM (PROM) Right Left   MCP 0/90 0/80   PIP 0/95 0/92   DIP 0/97 Pre: 0/55  Post: /68   BERNARD [phone]       Strength   (Measured in pounds)  Pain Report: - none  + mild    ++ moderate    +++ severe    6/30/2022 6/30/2022   Trials Right Left   1 103 101+   Average 103 101       Please refer to the daily flowsheet for treatment provided today.     Assessment:  Response to therapy has been improvement to:  ROM of Fingers: DIP joint - Ext  Response to  therapy has been lack of progress in:  ROM of Fingers: DIP joint - Flex    Overall Assessment:  Patient is ready to progress to next level of protocol.  Patient would benefit from continued therapy to achieve rehab potential  STG/LTG:  STGoals have been reviewed and progress or achievement has occurred;  see goal sheet for details and updates.    Plan:  Frequency/Duration:  Recommend continuing to see patient  2 X month, once daily  for 2 months  Appropriateness of Rx I have re-evaluated this patient and find that the nature, scope, duration and intensity of the therapy is appropriate for the medical condition of the patient.  Recommendations for Continued Therapy  Additions to Treatment Plan -  Modalities:  Paraffin  Therapeutic Exercise:  AROM, AAROM, PROM, Tendon Gliding and Blocking  Manual Techniques:  Myofascial release    Treatment Plan:  See plan outlined at initial evaluation    Home Exercise Program:  Finger AROM  Tendon gliding  PIP and DIP blocking  Warmth for stiffness    Next Visit:  Progress ROM (add DIP flexion PROM)

## 2022-07-01 ENCOUNTER — THERAPY VISIT (OUTPATIENT)
Dept: OCCUPATIONAL THERAPY | Facility: CLINIC | Age: 22
End: 2022-07-01
Attending: PHYSICIAN ASSISTANT
Payer: COMMERCIAL

## 2022-07-01 DIAGNOSIS — S62.637D CLOSED DISPLACED FRACTURE OF DISTAL PHALANX OF LEFT LITTLE FINGER WITH ROUTINE HEALING, SUBSEQUENT ENCOUNTER: ICD-10-CM

## 2022-07-01 PROCEDURE — 97110 THERAPEUTIC EXERCISES: CPT | Mod: GO | Performed by: OCCUPATIONAL THERAPIST

## 2022-07-19 ENCOUNTER — THERAPY VISIT (OUTPATIENT)
Dept: OCCUPATIONAL THERAPY | Facility: CLINIC | Age: 22
End: 2022-07-19
Payer: COMMERCIAL

## 2022-07-19 DIAGNOSIS — S62.637D CLOSED DISPLACED FRACTURE OF DISTAL PHALANX OF LEFT LITTLE FINGER WITH ROUTINE HEALING, SUBSEQUENT ENCOUNTER: Primary | ICD-10-CM

## 2022-07-19 PROCEDURE — 97110 THERAPEUTIC EXERCISES: CPT | Mod: GO | Performed by: OCCUPATIONAL THERAPIST

## 2022-07-19 NOTE — PROGRESS NOTES
SOAP note objective information for 7/19/2022.  Please refer to the daily flowsheet for treatment today, total treatment time and time spent performing 1:1 timed codes.     Diagnosis: Left small mallet finger  DOI: 2/22/2022  Post: 4 months  Referring provider: Joycelyn Sneed PA-C    Pain Level (Scale 0-10):   3/22/2022 7/1/2022     At Rest 0/10 0   With Use 1/10 2-3     Pain Description:  Date 3/22/2022 7/1/2022     Location small finger Small finger   Pain Quality Dull dull   Frequency intermittent   intermittent   Pain is worst  daytime daytime   Exacerbated by  Unknown Finger flexion   Relieved by Rest rest   Progression Unchanged Gradually improving     Edema (Circumference measured in cm)   3/22/2022 3/22/2022 7/1/2022     Small finger R L L   P1 6.0 6.3 6.0   PIP 5.7 5.8 6.3   P2 4.9 5.3 5.3     Sensation  WNL throughout all nerve distributions; per patient report    ROM  Small Finger 6/30/2022 6/30/2022 7/19/2022     AROM (PROM) Right Left Left   MCP 0/90 0/80    PIP 0/95 0/92    DIP 0/97 Pre: 0/55  Post: /68 Pre: 0/65  Post: 0/80   BERNARD [phone]        Strength   (Measured in pounds)  Pain Report: - none  + mild    ++ moderate    +++ severe    6/30/2022 6/30/2022   Trials Right Left   1 103 101+   Average 103 101       Home Exercise Program:  Finger AROM  Tendon gliding  PIP and DIP blocking  Warmth for stiffness    Next Visit:  Progress ROM (add DIP flexion PROM)

## 2022-08-05 ENCOUNTER — THERAPY VISIT (OUTPATIENT)
Dept: OCCUPATIONAL THERAPY | Facility: CLINIC | Age: 22
End: 2022-08-05
Payer: COMMERCIAL

## 2022-08-05 DIAGNOSIS — S62.637D CLOSED DISPLACED FRACTURE OF DISTAL PHALANX OF LEFT LITTLE FINGER WITH ROUTINE HEALING, SUBSEQUENT ENCOUNTER: Primary | ICD-10-CM

## 2022-08-05 NOTE — PROGRESS NOTES
SOAP note objective information for 8/5/2022  Please refer to the daily flowsheet for treatment today, total treatment time and time spent performing 1:1 timed codes.     Diagnosis: Left small mallet finger  DOI: 2/22/2022  Post: 4 months  Referring provider: Joycelyn Sneed PA-C    Pain Level (Scale 0-10):   3/22/2022 7/1/2022     At Rest 0/10 0   With Use 1/10 2-3     Pain Description:  Date 3/22/2022 7/1/2022     Location small finger Small finger   Pain Quality Dull dull   Frequency intermittent   intermittent   Pain is worst  daytime daytime   Exacerbated by  Unknown Finger flexion   Relieved by Rest rest   Progression Unchanged Gradually improving     Edema (Circumference measured in cm)   3/22/2022 3/22/2022 7/1/2022     Small finger R L L   P1 6.0 6.3 6.0   PIP 5.7 5.8 6.3   P2 4.9 5.3 5.3     Sensation  WNL throughout all nerve distributions; per patient report    ROM  Small Finger 6/30/2022 6/30/2022 7/19/2022 8/2/2022   AROM (PROM) Right Left Left Left   MCP 0/90 0/80  WNL   PIP 0/95 0/92  WNL   DIP 0/97 Pre: 0/55  Post: /68 Pre: 0/65  Post: 0/80 0/80   BERNARD [phone]         Strength   (Measured in pounds)  Pain Report: - none  + mild    ++ moderate    +++ severe    6/30/2022 6/30/2022 8/5/2022   Trials Right Left Left   1 103 101+ 101, pain-free   Average 103 101 101       Home Exercise Program:  Finger AROM  Tendon gliding  PIP and DIP blocking  Warmth for stiffness    Next Visit:  Progress ROM (add DIP flexion PROM)

## 2022-10-03 ENCOUNTER — HEALTH MAINTENANCE LETTER (OUTPATIENT)
Age: 22
End: 2022-10-03

## 2023-05-21 ENCOUNTER — HEALTH MAINTENANCE LETTER (OUTPATIENT)
Age: 23
End: 2023-05-21

## 2024-07-28 ENCOUNTER — HEALTH MAINTENANCE LETTER (OUTPATIENT)
Age: 24
End: 2024-07-28

## 2025-08-10 ENCOUNTER — HEALTH MAINTENANCE LETTER (OUTPATIENT)
Age: 25
End: 2025-08-10